# Patient Record
Sex: FEMALE | Race: WHITE | NOT HISPANIC OR LATINO | URBAN - METROPOLITAN AREA
[De-identification: names, ages, dates, MRNs, and addresses within clinical notes are randomized per-mention and may not be internally consistent; named-entity substitution may affect disease eponyms.]

---

## 2025-05-23 ENCOUNTER — PATIENT MESSAGE (OUTPATIENT)
Dept: PRIMARY CARE | Facility: CLINIC | Age: 35
End: 2025-05-23

## 2025-05-29 PROBLEM — D64.9 ANEMIA: Status: ACTIVE | Noted: 2025-05-29
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
## 2025-05-29 PROBLEM — E03.9 ACQUIRED HYPOTHYROIDISM: Status: ACTIVE | Noted: 2025-05-29

## 2025-05-30 ENCOUNTER — APPOINTMENT (OUTPATIENT)
Dept: PRIMARY CARE | Facility: CLINIC | Age: 35
End: 2025-05-30

## 2025-05-30 VITALS
HEART RATE: 72 BPM | SYSTOLIC BLOOD PRESSURE: 158 MMHG | DIASTOLIC BLOOD PRESSURE: 86 MMHG | WEIGHT: 255 LBS | BODY MASS INDEX: 43.54 KG/M2 | HEIGHT: 64 IN

## 2025-05-30 DIAGNOSIS — Z00.00 ROUTINE GENERAL MEDICAL EXAMINATION AT A HEALTH CARE FACILITY: Primary | ICD-10-CM

## 2025-05-30 DIAGNOSIS — D64.9 ANEMIA, UNSPECIFIED TYPE: ICD-10-CM

## 2025-05-30 DIAGNOSIS — I10 PRIMARY HYPERTENSION: ICD-10-CM

## 2025-05-30 DIAGNOSIS — Z13.29 SCREENING FOR THYROID DISORDER: ICD-10-CM

## 2025-05-30 DIAGNOSIS — R73.03 PREDIABETES: ICD-10-CM

## 2025-05-30 DIAGNOSIS — E66.01 MORBID OBESITY WITH BMI OF 40.0-44.9, ADULT (MULTI): ICD-10-CM

## 2025-05-30 DIAGNOSIS — E55.9 VITAMIN D DEFICIENCY: ICD-10-CM

## 2025-05-30 DIAGNOSIS — Z13.220 SCREENING FOR LIPID DISORDERS: ICD-10-CM

## 2025-05-30 DIAGNOSIS — Z13.1 SCREENING FOR DIABETES MELLITUS: ICD-10-CM

## 2025-05-30 PROCEDURE — 99385 PREV VISIT NEW AGE 18-39: CPT | Performed by: FAMILY MEDICINE

## 2025-05-30 PROCEDURE — 3008F BODY MASS INDEX DOCD: CPT | Performed by: FAMILY MEDICINE

## 2025-05-30 PROCEDURE — 3079F DIAST BP 80-89 MM HG: CPT | Performed by: FAMILY MEDICINE

## 2025-05-30 PROCEDURE — 3077F SYST BP >= 140 MM HG: CPT | Performed by: FAMILY MEDICINE

## 2025-05-30 RX ORDER — VALSARTAN AND HYDROCHLOROTHIAZIDE 80; 12.5 MG/1; MG/1
1 TABLET, FILM COATED ORAL DAILY
Qty: 30 TABLET | Refills: 2 | Status: SHIPPED | OUTPATIENT
Start: 2025-05-30 | End: 2026-05-30

## 2025-05-30 RX ORDER — SEMAGLUTIDE 0.25 MG/.5ML
0.25 INJECTION, SOLUTION SUBCUTANEOUS WEEKLY
Qty: 2 ML | Refills: 0 | Status: SHIPPED | OUTPATIENT
Start: 2025-05-30 | End: 2025-06-10 | Stop reason: ALTCHOICE

## 2025-05-30 ASSESSMENT — PATIENT HEALTH QUESTIONNAIRE - PHQ9
SUM OF ALL RESPONSES TO PHQ9 QUESTIONS 1 AND 2: 0
1. LITTLE INTEREST OR PLEASURE IN DOING THINGS: NOT AT ALL
2. FEELING DOWN, DEPRESSED OR HOPELESS: NOT AT ALL

## 2025-05-30 ASSESSMENT — ENCOUNTER SYMPTOMS
DEPRESSION: 0
OCCASIONAL FEELINGS OF UNSTEADINESS: 0
LOSS OF SENSATION IN FEET: 0

## 2025-05-30 NOTE — PROGRESS NOTES
"  Subjective     Patient ID: Liseth Paredes is a 34 y.o. female who presents for New Patient Visit (Blood work ).      Last Physical : 1 Years ago     Pt's PMH, PSH, SH, FH , meds and allergies was obtained / reviewed and updated .     Dental visits : Y  Vision issues : N  Hearing issues : N    Immunizations : Y    Diet : healthy  Exercise: active  Weight concerns : has been losing weight but very slow.     Alcohol: as noted in SH  Tobacco: as noted in SH  Recreational drug use : None/ as noted in SH    Sexually active : Active   Contraception :   Menstrual problems:  G:        PAP smear : UTD    Metabolic screening   - Lipid   - Glucose    Would like help losing weight     ======================================================    Visit Vitals  /86   Pulse 72   Ht 1.626 m (5' 4\")   Wt 116 kg (255 lb)   BMI 43.77 kg/m²   Smoking Status Never   BSA 2.29 m²          =====================================    Review of systems:  Constitutional: no chills, no fever and no night sweats.     Eyes: no blurred vision and no eyesight problems.     ENT: no hearing loss, no nasal congestion, no nasal discharge, no hoarseness     Cardiovascular: no chest pain, no lower extremity edema,     Respiratory: no cough, no shortness of breath     Gastrointestinal: no abdominal pain,  no constipation, no diarrhea,     Genitourinary: no dysuria, no change in urinary frequency,     Musculoskeletal: no arthralgias, no myalgias.     Integumentary: no new skin lesions     Neurological: no difficulty walking, no limb weakness, no numbness and no tingling.     Psychiatric: no anxiety, no depression,   ============================================================    Physical exam :    Constitutional: Alert and in no acute distress.    Eyes: Normal external exam. Pupils were equal in size, round, reactive to light (PERRL) with normal accommodation and extraocular movements intact (EOMI).     Ears, Nose, Mouth, and Throat: External inspection " of ears and nose: Normal.  Otoscopic examination: Normal.      Neck: No neck mass was observed. Supple.     Cardiovascular: Heart rate and rhythm were normal, normal S1 and S2, no gallops, no murmurs   Pulmonary: No respiratory distress. Clear bilateral breath sounds.     Abdomen: Soft nontender; no abdominal mass palpated. No organomegaly.     Musculoskeletal: No joint swelling seen, normal movements of all extremities.    Skin: Normal skin color and pigmentation, n    Neurologic: Deep tendon reflexes were 2+ and symmetric.     Psychiatric: Judgment and insight: Intact. Mood and affect: Normal.    Lymphatic : Cervical/ axillary/ groin Lns Palpable/ non palpable            All other systems have been reviewed and are negative for complaint.      Assessment/Plan    Problem List Items Addressed This Visit       Anemia    Relevant Orders    Lipid Panel (Completed)    CBC (Completed)    TSH with reflex to Free T4 if abnormal    Hemoglobin A1C (Completed)    Comprehensive Metabolic Panel (Completed)    Vitamin B12 (Completed)    Vitamin D 25-Hydroxy,Total (for eval of Vitamin D levels) (Completed)    T4, free (Completed)    Routine general medical examination at a health care facility - Primary    Relevant Orders    Lipid Panel (Completed)    CBC (Completed)    TSH with reflex to Free T4 if abnormal    Hemoglobin A1C (Completed)    Comprehensive Metabolic Panel (Completed)    Vitamin B12 (Completed)    Vitamin D 25-Hydroxy,Total (for eval of Vitamin D levels) (Completed)    T4, free (Completed)    Primary hypertension    Relevant Medications    valsartan-hydrochlorothiazide (Diovan-HCT) 80-12.5 mg tablet    Other Relevant Orders    Lipid Panel (Completed)    CBC (Completed)    TSH with reflex to Free T4 if abnormal    Hemoglobin A1C (Completed)    Comprehensive Metabolic Panel (Completed)    Vitamin B12 (Completed)    Vitamin D 25-Hydroxy,Total (for eval of Vitamin D levels) (Completed)    T4, free (Completed)    Morbid  obesity with BMI of 40.0-44.9, adult (Multi)    Relevant Medications    semaglutide, weight loss, (Wegovy) 0.25 mg/0.5 mL pen injector    Other Relevant Orders    Lipid Panel (Completed)    CBC (Completed)    TSH with reflex to Free T4 if abnormal    Hemoglobin A1C (Completed)    Comprehensive Metabolic Panel (Completed)    Vitamin B12 (Completed)    Vitamin D 25-Hydroxy,Total (for eval of Vitamin D levels) (Completed)    T4, free (Completed)    Prediabetes    Relevant Orders    Lipid Panel (Completed)    CBC (Completed)    TSH with reflex to Free T4 if abnormal    Hemoglobin A1C (Completed)    Comprehensive Metabolic Panel (Completed)    Vitamin B12 (Completed)    Vitamin D 25-Hydroxy,Total (for eval of Vitamin D levels) (Completed)    T4, free (Completed)    Screening for diabetes mellitus    Relevant Orders    Lipid Panel (Completed)    CBC (Completed)    TSH with reflex to Free T4 if abnormal    Hemoglobin A1C (Completed)    Comprehensive Metabolic Panel (Completed)    Vitamin B12 (Completed)    Vitamin D 25-Hydroxy,Total (for eval of Vitamin D levels) (Completed)    T4, free (Completed)    Screening for thyroid disorder    Relevant Orders    Lipid Panel (Completed)    CBC (Completed)    TSH with reflex to Free T4 if abnormal    Hemoglobin A1C (Completed)    Comprehensive Metabolic Panel (Completed)    Vitamin B12 (Completed)    Vitamin D 25-Hydroxy,Total (for eval of Vitamin D levels) (Completed)    T4, free (Completed)    Screening for lipid disorders    Relevant Orders    Lipid Panel (Completed)    CBC (Completed)    TSH with reflex to Free T4 if abnormal    Hemoglobin A1C (Completed)    Comprehensive Metabolic Panel (Completed)    Vitamin B12 (Completed)    Vitamin D 25-Hydroxy,Total (for eval of Vitamin D levels) (Completed)    T4, free (Completed)    Vitamin D deficiency    Relevant Orders    Lipid Panel (Completed)    CBC (Completed)    TSH with reflex to Free T4 if abnormal    Hemoglobin A1C (Completed)     Comprehensive Metabolic Panel (Completed)    Vitamin B12 (Completed)    Vitamin D 25-Hydroxy,Total (for eval of Vitamin D levels) (Completed)    T4, free (Completed)

## 2025-05-30 NOTE — PATIENT INSTRUCTIONS
THOMAS kingsley      You have been prescribed a Glucagon-like peptide-1 (GLP-1) receptor agonist - this medication works as an adjunct to a reduced calorie diet and increased physical activity to improve glucose/insulin regulation and can also help with chronic weight management in adults.     - This is an injectable medication.    - This medication works by decreasing the appetite and increased feeling of fullness. Also will help improve glucose values.      - Administer WEGOVY once weekly, on the same day each week, at any time of day, with or without meals.     - Adminstered SAXENDA once daily, at any time of day, with or without meals    - Inject subcutaneously in the abdomen, thigh or upper arm.  - Your initial dose is the lowest dose of medication, you will increase the dose as directed on your prescription.       - Possible Side Effects Include: Nausea, Diarrhea, Vomiting, Constipation, Abdominal Pain, Headache, Fatigue, Upset Stomach, Dizziness, Abdominal Distention, Hypoglycemia/Low Blood Sugar (in patients with Type II Diabetes), flatulence, gastroenteritis, and GERD.     - If you have a personal or family history of medullary thyroid cancer or multiple endocrine neoplasia syndrome type 2 you should not take this medication.  If you have a history of pancreatitis you should not take this medication.       Follow up 4-6 weeks

## 2025-05-31 LAB
25(OH)D3+25(OH)D2 SERPL-MCNC: 25 NG/ML (ref 30–100)
ALBUMIN SERPL-MCNC: 4.2 G/DL (ref 3.6–5.1)
ALP SERPL-CCNC: 80 U/L (ref 31–125)
ALT SERPL-CCNC: 12 U/L (ref 6–29)
ANION GAP SERPL CALCULATED.4IONS-SCNC: 7 MMOL/L (CALC) (ref 7–17)
AST SERPL-CCNC: 14 U/L (ref 10–30)
BILIRUB SERPL-MCNC: 0.5 MG/DL (ref 0.2–1.2)
BUN SERPL-MCNC: 8 MG/DL (ref 7–25)
CALCIUM SERPL-MCNC: 8.7 MG/DL (ref 8.6–10.2)
CHLORIDE SERPL-SCNC: 105 MMOL/L (ref 98–110)
CHOLEST SERPL-MCNC: 158 MG/DL
CHOLEST/HDLC SERPL: 3.4 (CALC)
CO2 SERPL-SCNC: 26 MMOL/L (ref 20–32)
CREAT SERPL-MCNC: 0.51 MG/DL (ref 0.5–0.97)
EGFRCR SERPLBLD CKD-EPI 2021: 124 ML/MIN/1.73M2
ERYTHROCYTE [DISTWIDTH] IN BLOOD BY AUTOMATED COUNT: 17.5 % (ref 11–15)
EST. AVERAGE GLUCOSE BLD GHB EST-MCNC: 114 MG/DL
EST. AVERAGE GLUCOSE BLD GHB EST-SCNC: 6.3 MMOL/L
GLUCOSE SERPL-MCNC: 93 MG/DL (ref 65–99)
HBA1C MFR BLD: 5.6 %
HCT VFR BLD AUTO: 36.1 % (ref 35–45)
HDLC SERPL-MCNC: 47 MG/DL
HGB BLD-MCNC: 10.1 G/DL (ref 11.7–15.5)
LDLC SERPL CALC-MCNC: 95 MG/DL (CALC)
MCH RBC QN AUTO: 19.4 PG (ref 27–33)
MCHC RBC AUTO-ENTMCNC: 28 G/DL (ref 32–36)
MCV RBC AUTO: 69.3 FL (ref 80–100)
NONHDLC SERPL-MCNC: 111 MG/DL (CALC)
PLATELET # BLD AUTO: 373 THOUSAND/UL (ref 140–400)
PMV BLD REES-ECKER: 10.2 FL (ref 7.5–12.5)
POTASSIUM SERPL-SCNC: 3.9 MMOL/L (ref 3.5–5.3)
PROT SERPL-MCNC: 6.8 G/DL (ref 6.1–8.1)
RBC # BLD AUTO: 5.21 MILLION/UL (ref 3.8–5.1)
SODIUM SERPL-SCNC: 138 MMOL/L (ref 135–146)
T4 FREE SERPL-MCNC: 1.4 NG/DL (ref 0.8–1.8)
TRIGL SERPL-MCNC: 72 MG/DL
TSH SERPL-ACNC: 0.02 MIU/L
VIT B12 SERPL-MCNC: 294 PG/ML (ref 200–1100)
WBC # BLD AUTO: 6.5 THOUSAND/UL (ref 3.8–10.8)

## 2025-06-04 DIAGNOSIS — E05.90 HYPERTHYROIDISM: Primary | ICD-10-CM

## 2025-06-04 DIAGNOSIS — E53.8 B12 DEFICIENCY: ICD-10-CM

## 2025-06-04 DIAGNOSIS — D50.0 IRON DEFICIENCY ANEMIA DUE TO CHRONIC BLOOD LOSS: ICD-10-CM

## 2025-06-04 DIAGNOSIS — E55.9 VITAMIN D DEFICIENCY: ICD-10-CM

## 2025-06-04 RX ORDER — ERGOCALCIFEROL 1.25 MG/1
1.25 CAPSULE ORAL WEEKLY
Qty: 12 CAPSULE | Refills: 1 | Status: SHIPPED | OUTPATIENT
Start: 2025-06-04 | End: 2025-11-19

## 2025-06-04 RX ORDER — THIAMINE HCL 100 MG
2500 TABLET ORAL DAILY
Qty: 30 LOZENGE | Refills: 11 | Status: SHIPPED | OUTPATIENT
Start: 2025-06-04

## 2025-06-06 ENCOUNTER — HOSPITAL ENCOUNTER (OUTPATIENT)
Dept: RADIOLOGY | Facility: CLINIC | Age: 35
Discharge: HOME | End: 2025-06-06
Payer: COMMERCIAL

## 2025-06-06 DIAGNOSIS — E05.90 HYPERTHYROIDISM: ICD-10-CM

## 2025-06-06 PROCEDURE — 76536 US EXAM OF HEAD AND NECK: CPT

## 2025-06-09 PROBLEM — Z13.220 SCREENING FOR LIPID DISORDERS: Status: ACTIVE | Noted: 2025-06-09

## 2025-06-09 PROBLEM — Z13.1 SCREENING FOR DIABETES MELLITUS: Status: ACTIVE | Noted: 2025-06-09

## 2025-06-09 PROBLEM — Z13.29 SCREENING FOR THYROID DISORDER: Status: ACTIVE | Noted: 2025-06-09

## 2025-06-09 PROBLEM — E55.9 VITAMIN D DEFICIENCY: Status: ACTIVE | Noted: 2025-06-09

## 2025-06-09 PROBLEM — R73.03 PREDIABETES: Status: ACTIVE | Noted: 2025-06-09

## 2025-06-09 PROBLEM — I10 PRIMARY HYPERTENSION: Status: ACTIVE | Noted: 2025-06-09

## 2025-06-09 PROBLEM — E66.01 MORBID OBESITY WITH BMI OF 40.0-44.9, ADULT (MULTI): Status: ACTIVE | Noted: 2025-06-09

## 2025-06-10 ENCOUNTER — PATIENT MESSAGE (OUTPATIENT)
Dept: PRIMARY CARE | Facility: CLINIC | Age: 35
End: 2025-06-10

## 2025-06-10 ENCOUNTER — APPOINTMENT (OUTPATIENT)
Dept: PRIMARY CARE | Facility: CLINIC | Age: 35
End: 2025-06-10
Payer: COMMERCIAL

## 2025-06-10 VITALS
BODY MASS INDEX: 41.83 KG/M2 | WEIGHT: 245 LBS | DIASTOLIC BLOOD PRESSURE: 115 MMHG | SYSTOLIC BLOOD PRESSURE: 171 MMHG | HEART RATE: 87 BPM | HEIGHT: 64 IN

## 2025-06-10 DIAGNOSIS — D50.0 IRON DEFICIENCY ANEMIA DUE TO CHRONIC BLOOD LOSS: ICD-10-CM

## 2025-06-10 DIAGNOSIS — I10 PRIMARY HYPERTENSION: ICD-10-CM

## 2025-06-10 DIAGNOSIS — E05.90 HYPERTHYROIDISM: Primary | ICD-10-CM

## 2025-06-10 DIAGNOSIS — R00.0 TACHYCARDIA: ICD-10-CM

## 2025-06-10 PROCEDURE — 3077F SYST BP >= 140 MM HG: CPT | Performed by: FAMILY MEDICINE

## 2025-06-10 PROCEDURE — 99214 OFFICE O/P EST MOD 30 MIN: CPT | Performed by: FAMILY MEDICINE

## 2025-06-10 PROCEDURE — 3008F BODY MASS INDEX DOCD: CPT | Performed by: FAMILY MEDICINE

## 2025-06-10 PROCEDURE — 3080F DIAST BP >= 90 MM HG: CPT | Performed by: FAMILY MEDICINE

## 2025-06-10 RX ORDER — METOPROLOL SUCCINATE 50 MG/1
50 TABLET, EXTENDED RELEASE ORAL DAILY
Qty: 90 TABLET | Refills: 1 | Status: SHIPPED | OUTPATIENT
Start: 2025-06-10 | End: 2025-12-07

## 2025-06-10 RX ORDER — FERROUS SULFATE 325(65) MG
325 TABLET, DELAYED RELEASE (ENTERIC COATED) ORAL 2 TIMES DAILY
Qty: 180 TABLET | Refills: 3 | Status: SHIPPED | OUTPATIENT
Start: 2025-06-10 | End: 2026-06-10

## 2025-06-12 ENCOUNTER — E-CONSULT (OUTPATIENT)
Dept: ENDOCRINOLOGY | Facility: CLINIC | Age: 35
End: 2025-06-12
Payer: COMMERCIAL

## 2025-06-12 DIAGNOSIS — E05.90 HYPERTHYROIDISM: Primary | ICD-10-CM

## 2025-06-12 LAB
FERRITIN SERPL-MCNC: 5 NG/ML (ref 16–154)
IRON SATN MFR SERPL: 6 % (CALC) (ref 16–45)
IRON SERPL-MCNC: 29 MCG/DL (ref 40–190)
T3FREE SERPL-MCNC: 4.4 PG/ML (ref 2.3–4.2)
T4 FREE SERPL-MCNC: 1.6 NG/DL (ref 0.8–1.8)
T4BG SERPL-MCNC: 24.9 MCG/ML (ref 13.5–30.9)
THYROGLOB AB SERPL-ACNC: 1 IU/ML
THYROPEROXIDASE AB SERPL-ACNC: 438 IU/ML
TIBC SERPL-MCNC: 455 MCG/DL (CALC) (ref 250–450)
TSH BII SER-ACNC: 9.99 IU/L
TSH SERPL-ACNC: 0.02 MIU/L
TSI SER-ACNC: 322 % BASELINE

## 2025-06-12 PROCEDURE — 99451 NTRPROF PH1/NTRNET/EHR 5/>: CPT | Performed by: INTERNAL MEDICINE

## 2025-06-12 NOTE — PROGRESS NOTES
E-Consult note:     Summary of data review: Thank you for your question  This is a 34-year-old female with strong family history of thyroid disorders.  Review of her records confirms the diagnosis of hyperthyroidism with elevated TSI and TPO.  Most likely confirms a diagnosis of Graves' disease at this time with thyroid nodules on the ultrasound.  Findings / recommendations:   After careful review of the patient's information available in the medical record, the following are my findings and recommendations:     Thank you for your question I agree starting methimazole around 10 mg once a day.  Discussing with her the possible side effects, such as rash, liver enzyme elevation, low WBC count that might increase risk of infections.  They are rare but the patient should be aware.  I would highly recommend official endocrinology consult until she can see us perhaps repeating another free T4 free T3 and TSH in 4 to 6 weeks.  Thank you for your great care    Diagnosis:  No diagnosis found.     eConsult Time: 15 minutes      Ze Garcia MD      6/12/2025

## 2025-06-15 RX ORDER — METHIMAZOLE 10 MG/1
10 TABLET ORAL DAILY
Qty: 30 TABLET | Refills: 2 | Status: SHIPPED | OUTPATIENT
Start: 2025-06-15 | End: 2025-09-13

## 2025-06-23 PROBLEM — R00.0 TACHYCARDIA: Status: ACTIVE | Noted: 2025-06-23

## 2025-06-23 PROBLEM — E05.90 HYPERTHYROIDISM: Status: ACTIVE | Noted: 2025-06-23

## 2025-06-23 ASSESSMENT — ENCOUNTER SYMPTOMS
PALPITATIONS: 1
ACTIVITY CHANGE: 1
MUSCULOSKELETAL NEGATIVE: 1
SHORTNESS OF BREATH: 1
SLEEP DISTURBANCE: 1
DIZZINESS: 1
FATIGUE: 1
DIAPHORESIS: 1
NERVOUS/ANXIOUS: 1
UNEXPECTED WEIGHT CHANGE: 1
EYES NEGATIVE: 1
APPETITE CHANGE: 1

## 2025-06-23 NOTE — PROGRESS NOTES
"Subjective   Patient ID: Liseth Paredes is a 34 y.o. female who presents for Palpitations, Shortness of Breath, and Medication Question.      Palpitations   Associated symptoms include anxiety, diaphoresis, dizziness and shortness of breath.   Shortness of Breath     here to discuss labs. Was started on Valsartan Took one  tab felt dizzy SOB  TSH was low. Has had sweating palitaions weight loss.   TSI high   Medications Ordered Prior to Encounter[1]     Review of Systems   Constitutional:  Positive for activity change, appetite change, diaphoresis, fatigue and unexpected weight change.   HENT: Negative.     Eyes: Negative.    Respiratory:  Positive for shortness of breath.    Cardiovascular:  Positive for palpitations.   Endocrine: Positive for heat intolerance.   Genitourinary: Negative.    Musculoskeletal: Negative.    Neurological:  Positive for dizziness.   Psychiatric/Behavioral:  Positive for sleep disturbance. The patient is nervous/anxious.        Objective   BP (!) 171/115   Pulse 87   Ht 1.626 m (5' 4\")   Wt 111 kg (245 lb)   BMI 42.05 kg/m²   BSA: 2.24 meters squared  Growth percentiles: Facility age limit for growth %cali is 20 years. Facility age limit for growth %cali is 20 years.   Orders Only on 06/04/2025   Component Date Value Ref Range Status    THYROGLOBULIN ANTIBODIES 06/06/2025 1  < or = 1 IU/mL Final    TSI (THYROID STIMULATING IMMUNOGLO* 06/06/2025 322 (H)  <140 % baseline Final    Comment:    Thyroid stimulating immunoglobulins (TSI) can engage  the TSH receptors resulting in hyperthyroidism in  Graves' disease patients. TSI levels can be useful in  monitoring the clinical outcome of Graves' disease as  well as assessing the potential for hyperthyroidism  from maternal-fetal transfer. TSI results greater than  or equal to (>=) 140% of the Reference Control are  considered positive.     NOTE:  A serum TSH level greater than 350 micro-International  Units/mL can interfere with the TSI " bioassay and  potentially give false positive results.     Patients who are pregnant and are suspected of having  hyperthyroidism should have both TSI and human  Chorionic Gonadotropin(hCG) tests measured. A serum  hCG level greater than 40,625 mIU/mL can interfere  with the TSI bioassay and may give false negative  results. In these patients it is recommended that  a second TSI be obtained when the hCG concentration  falls below 40,625 mIU/mL (usually after approximately  20-weeks gestation)                           .     The analytical performance characteristics of this  assay have been determined by [facility], [place], [place].  The modifications  have not been cleared or approved by the FDA.  This  assay has been validated pursuant to the CLIA  regulations and is used for clinical purposes.             TRAB 06/06/2025 9.99 (H)  <=2.00 IU/L Final    Comment:    This test was performed using the TRAb Antibody ELEANOR  method which is standardized against the 1st  International Standard 90/672 and is reported in  International Units (IU/L). The reference range  reported was established specifically for this test  method.             T3, FREE 06/06/2025 4.4 (H)  2.3 - 4.2 pg/mL Final    THYROID PEROXIDASE ANTIBODIES 06/06/2025 438 (H)  <9 IU/mL Final    TBG 06/06/2025 24.9  13.5 - 30.9 mcg/mL Final    Comment:    To convert to nmol/L, multiply the result by 18.5.         TSH 06/06/2025 0.02 (L)  mIU/L Final    Comment:           Reference Range                         > or = 20 Years  0.40-4.50                              Pregnancy Ranges            First trimester    0.26-2.66            Second trimester   0.55-2.73            Third trimester    0.43-2.91      T4, FREE 06/06/2025 1.6  0.8 - 1.8 ng/dL Final    IRON, TOTAL 06/06/2025 29 (L)  40 - 190 mcg/dL Final    IRON BINDING CAPACITY 06/06/2025 455 (H)  250 - 450 mcg/dL (calc) Final    % SATURATION 06/06/2025 6 (L)  16 - 45 % (calc) Final     FERRITIN 06/06/2025 5 (L)  16 - 154 ng/mL Final      Physical Exam  Constitutional:       Appearance: She is obese.   Cardiovascular:      Rate and Rhythm: Regular rhythm. Tachycardia present.   Pulmonary:      Effort: Pulmonary effort is normal.      Breath sounds: Normal breath sounds.   Abdominal:      General: Bowel sounds are normal.   Neurological:      General: No focal deficit present.      Mental Status: She is alert.   Psychiatric:         Mood and Affect: Mood normal.         Assessment/Plan   Problem List Items Addressed This Visit       Anemia    Relevant Medications    ferrous sulfate 325 (65 Fe) mg EC tablet    Primary hypertension    Continue valsartan at half a tab and add metoprolol every night            Hyperthyroidism - Primary    Relevant Medications    methIMAzole (Tapazole) 10 mg tablet    Other Relevant Orders    Referral to Endocrinology    Tachycardia    Relevant Medications    metoprolol succinate XL (Toprol-XL) 50 mg 24 hr tablet    Other Relevant Orders    Referral to Endocrinology                   [1]   Current Outpatient Medications on File Prior to Visit   Medication Sig Dispense Refill    cyanocobalamin, vitamin B-12, 2,500 mcg lozenge Place 2,500 mcg under the tongue once daily. 30 lozenge 11    ergocalciferol (Vitamin D-2) 1250 mcg (50,000 units) capsule Take 1 capsule (1.25 mg) by mouth 1 (one) time per week. 12 capsule 1    valsartan-hydrochlorothiazide (Diovan-HCT) 80-12.5 mg tablet Take 1 tablet by mouth once daily. 30 tablet 2     No current facility-administered medications on file prior to visit.

## 2025-06-27 DIAGNOSIS — I10 PRIMARY HYPERTENSION: ICD-10-CM

## 2025-06-27 RX ORDER — VALSARTAN AND HYDROCHLOROTHIAZIDE 80; 12.5 MG/1; MG/1
1 TABLET, FILM COATED ORAL DAILY
Qty: 90 TABLET | Refills: 1 | Status: SHIPPED | OUTPATIENT
Start: 2025-06-27

## 2025-06-30 ENCOUNTER — TELEMEDICINE (OUTPATIENT)
Dept: PRIMARY CARE | Facility: CLINIC | Age: 35
End: 2025-06-30
Payer: COMMERCIAL

## 2025-06-30 DIAGNOSIS — K60.2 ANAL FISSURE: Primary | ICD-10-CM

## 2025-06-30 PROCEDURE — 1036F TOBACCO NON-USER: CPT

## 2025-06-30 PROCEDURE — 99203 OFFICE O/P NEW LOW 30 MIN: CPT

## 2025-06-30 RX ORDER — NITROGLYCERIN 4 MG/G
1 OINTMENT RECTAL 2 TIMES DAILY
Qty: 30 G | Refills: 0 | Status: SHIPPED | OUTPATIENT
Start: 2025-06-30

## 2025-06-30 ASSESSMENT — ENCOUNTER SYMPTOMS
ANAL BLEEDING: 1
LIGHT-HEADEDNESS: 0
DIARRHEA: 1
DIZZINESS: 0
RECTAL PAIN: 1
ABDOMINAL PAIN: 0
NAUSEA: 0
SHORTNESS OF BREATH: 0
CHILLS: 0
VOMITING: 0
HEADACHES: 0
BLOOD IN STOOL: 0
FEVER: 0
CONSTIPATION: 0

## 2025-06-30 NOTE — PROGRESS NOTES
"Subjective   Patient ID:     Liseth is a 33 yo female presenting with rectal pain and bleeding. She started new medications in the last month and was initially having diarrhea. That has since resolved but she is now having rectal pain and bleeding. She reports it is bright red blood during bowel movements and feels as if she is \"pooping glass.\" The pain used to only be with BM but within the last week it is now all the time. She tried doing sitz baths that mildly helped. She has a history of hemorrhoids. She denies feeling lightheaded or dizzy. Denies SOB or fevers.       Patient Care Team:  Home Azul MD as PCP - General (Family Medicine)    With patient's permission, this is a Telemedicine visit with video and audio. Provider located at office address. Patient located at their home address. All issues as documented below were discussed and addressed but limited physical exam was performed. If it was felt that the patient should be evaluated via face-to-face office appointment(s) they were directed to appropriate location.    PMH, PSH, family history and social history were reviewed and updated. Patient verbally confirmed they were in the [facility].     Virtual or Telephone Consent    An interactive audio and video telecommunication system which permits real time communications between the patient (at the originating site) and provider (at the distant site) was utilized to provide this telehealth service.   Verbal consent was requested and obtained from Liseth Paredes on this date, 06/30/25 for a telehealth visit and the patient's location was confirmed at the time of the visit.     Review of Systems   Constitutional:  Negative for chills and fever.   HENT: Negative.     Respiratory:  Negative for shortness of breath.    Cardiovascular:  Negative for chest pain.   Gastrointestinal:  Positive for anal bleeding, diarrhea (resolved now) and rectal pain. Negative for abdominal pain, blood in stool, constipation, " nausea and vomiting.   Genitourinary: Negative.    Neurological:  Negative for dizziness, light-headedness and headaches.       Objective   There were no vitals taken for this visit.    Limited due to virtual encounter.   General:  Appears alert and oriented and well-nourished with no signs of acute distress.   Face:  Normal appearing with no obvious neurological deficits.  Eyes:  EOMI x2.  Respiratory:  Breathing is non-labored.   Psychiatric:  Affect is appropriate and shows good judgment.      Assessment/Plan   Assessment & Plan  Anal fissure    Orders:    nitroglycerin (Rectiv) 0.4 % (w/w) rectal ointment; Insert 1 Application into the rectum 2 times a day.    Symptoms are consistent with anal fissure but hemorrhoids cannot be ruled out.   Advised trying ointment  Continue supportive care: tylenol/ibuprofen, sitz baths  Advised using a stool softener and eating enough fiber     If symptoms worsen or persist, patient advised to follow up with PCP or go to UC/ED.

## 2025-07-11 ENCOUNTER — APPOINTMENT (OUTPATIENT)
Dept: PRIMARY CARE | Facility: CLINIC | Age: 35
End: 2025-07-11
Payer: COMMERCIAL

## 2025-07-22 ENCOUNTER — APPOINTMENT (OUTPATIENT)
Dept: PRIMARY CARE | Facility: CLINIC | Age: 35
End: 2025-07-22
Payer: COMMERCIAL

## 2025-07-29 ENCOUNTER — APPOINTMENT (OUTPATIENT)
Dept: PRIMARY CARE | Facility: CLINIC | Age: 35
End: 2025-07-29
Payer: COMMERCIAL

## 2025-08-07 ENCOUNTER — APPOINTMENT (OUTPATIENT)
Dept: PRIMARY CARE | Facility: CLINIC | Age: 35
End: 2025-08-07
Payer: COMMERCIAL

## 2025-11-24 ENCOUNTER — APPOINTMENT (OUTPATIENT)
Dept: ENDOCRINOLOGY | Facility: CLINIC | Age: 35
End: 2025-11-24
Payer: COMMERCIAL